# Patient Record
Sex: MALE | Race: OTHER
[De-identification: names, ages, dates, MRNs, and addresses within clinical notes are randomized per-mention and may not be internally consistent; named-entity substitution may affect disease eponyms.]

---

## 2019-04-05 ENCOUNTER — HOSPITAL ENCOUNTER (OUTPATIENT)
Dept: HOSPITAL 8 - RAD | Age: 49
Discharge: HOME | End: 2019-04-05
Attending: FAMILY MEDICINE
Payer: COMMERCIAL

## 2019-04-05 DIAGNOSIS — I70.90: Primary | ICD-10-CM

## 2019-04-05 DIAGNOSIS — K42.9: ICD-10-CM

## 2019-04-05 DIAGNOSIS — Z87.828: ICD-10-CM

## 2019-04-05 DIAGNOSIS — Z88.0: ICD-10-CM

## 2019-04-05 DIAGNOSIS — Z95.820: ICD-10-CM

## 2019-04-05 DIAGNOSIS — I77.1: ICD-10-CM

## 2019-04-05 PROCEDURE — 73706 CT ANGIO LWR EXTR W/O&W/DYE: CPT

## 2021-01-28 ENCOUNTER — HOSPITAL ENCOUNTER (EMERGENCY)
Dept: HOSPITAL 8 - ED | Age: 51
Discharge: HOME | End: 2021-01-28
Payer: COMMERCIAL

## 2021-01-28 VITALS — BODY MASS INDEX: 41.29 KG/M2 | WEIGHT: 247.8 LBS | HEIGHT: 65 IN

## 2021-01-28 VITALS — SYSTOLIC BLOOD PRESSURE: 135 MMHG | DIASTOLIC BLOOD PRESSURE: 81 MMHG

## 2021-01-28 DIAGNOSIS — I82.432: ICD-10-CM

## 2021-01-28 DIAGNOSIS — Z87.891: ICD-10-CM

## 2021-01-28 DIAGNOSIS — I82.412: Primary | ICD-10-CM

## 2021-01-28 DIAGNOSIS — R05: ICD-10-CM

## 2021-01-28 DIAGNOSIS — M79.89: ICD-10-CM

## 2021-01-28 DIAGNOSIS — I10: ICD-10-CM

## 2021-01-28 DIAGNOSIS — I26.99: ICD-10-CM

## 2021-01-28 DIAGNOSIS — R00.9: ICD-10-CM

## 2021-01-28 DIAGNOSIS — M79.662: ICD-10-CM

## 2021-01-28 DIAGNOSIS — R07.89: ICD-10-CM

## 2021-01-28 LAB
ALBUMIN SERPL-MCNC: 3.1 G/DL (ref 3.4–5)
ANION GAP SERPL CALC-SCNC: 5 MMOL/L (ref 5–15)
BASOPHILS # BLD AUTO: 0.2 X10^3/UL (ref 0–0.1)
BASOPHILS NFR BLD AUTO: 2 % (ref 0–1)
CALCIUM SERPL-MCNC: 8.5 MG/DL (ref 8.5–10.1)
CHLORIDE SERPL-SCNC: 108 MMOL/L (ref 98–107)
CREAT SERPL-MCNC: 1.22 MG/DL (ref 0.7–1.3)
EOSINOPHIL # BLD AUTO: 0.2 X10^3/UL (ref 0–0.4)
EOSINOPHIL NFR BLD AUTO: 2 % (ref 1–7)
ERYTHROCYTE [DISTWIDTH] IN BLOOD BY AUTOMATED COUNT: 14.5 % (ref 9.4–14.8)
LYMPHOCYTES # BLD AUTO: 2.7 X10^3/UL (ref 1–3.4)
LYMPHOCYTES NFR BLD AUTO: 28 % (ref 22–44)
MCH RBC QN AUTO: 27 PG (ref 27.5–34.5)
MCHC RBC AUTO-ENTMCNC: 32.8 G/DL (ref 33.2–36.2)
MD: NO
MONOCYTES # BLD AUTO: 1.2 X10^3/UL (ref 0.2–0.8)
MONOCYTES NFR BLD AUTO: 13 % (ref 2–9)
NEUTROPHILS # BLD AUTO: 5.4 X10^3/UL (ref 1.8–6.8)
NEUTROPHILS NFR BLD AUTO: 56 % (ref 42–75)
PLATELET # BLD AUTO: 310 X10^3/UL (ref 130–400)
PMV BLD AUTO: 6.8 FL (ref 7.4–10.4)
RBC # BLD AUTO: 4.11 X10^6/UL (ref 4.38–5.82)
TROPONIN I SERPL-MCNC: < 0.015 NG/ML (ref 0–0.04)

## 2021-01-28 PROCEDURE — 36415 COLL VENOUS BLD VENIPUNCTURE: CPT

## 2021-01-28 PROCEDURE — 82040 ASSAY OF SERUM ALBUMIN: CPT

## 2021-01-28 PROCEDURE — 85520 HEPARIN ASSAY: CPT

## 2021-01-28 PROCEDURE — 71045 X-RAY EXAM CHEST 1 VIEW: CPT

## 2021-01-28 PROCEDURE — 80048 BASIC METABOLIC PNL TOTAL CA: CPT

## 2021-01-28 PROCEDURE — 93971 EXTREMITY STUDY: CPT

## 2021-01-28 PROCEDURE — 85025 COMPLETE CBC W/AUTO DIFF WBC: CPT

## 2021-01-28 PROCEDURE — 84484 ASSAY OF TROPONIN QUANT: CPT

## 2021-01-28 PROCEDURE — 85379 FIBRIN DEGRADATION QUANT: CPT

## 2021-01-28 PROCEDURE — 93005 ELECTROCARDIOGRAM TRACING: CPT

## 2021-01-28 PROCEDURE — 83880 ASSAY OF NATRIURETIC PEPTIDE: CPT

## 2021-01-28 PROCEDURE — 99285 EMERGENCY DEPT VISIT HI MDM: CPT

## 2021-01-28 PROCEDURE — 71275 CT ANGIOGRAPHY CHEST: CPT

## 2021-01-28 NOTE — NUR
CC OF LEFT LEG SWELLING AND PAIN X 3 WEEKS WITH INCREASING SOB. PT STATES HE 
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 
PT IN CUSTODY, HAS TWO GUARDS AT BEDSIDE.

## 2021-01-28 NOTE — NUR
ELIGUIS GIVEN BEFORE DISCHARGE, HEPARIN DC'D. PT AND GUARDS VERBALIZE 
UNDERSTANDING OF MEDICATIONS AND POC. PT TO BE DC'D.

## 2021-01-28 NOTE — NUR
PER DR HOPPER HOLD ON STARTING HEPARIN GTT AT THIS TIME, PT MAY BE A CANIDATE 
FOR ELIQUIS AND DC BACK TO group home. HEPARIN GTT SCANNED IN WHILE MD WALKING INTO 
ROOM, HEPARIN LINE NOT CONNECTED TO PTS PIV, MED CHARTING UNDONE AND VERIFIED 
WITH CASTILOL GARCIA.

## 2021-02-25 ENCOUNTER — HOSPITAL ENCOUNTER (OUTPATIENT)
Dept: HOSPITAL 8 - CVU | Age: 51
Discharge: HOME | End: 2021-02-25
Attending: PHYSICIAN ASSISTANT
Payer: COMMERCIAL

## 2021-02-25 DIAGNOSIS — I77.1: Primary | ICD-10-CM

## 2021-02-25 DIAGNOSIS — R10.31: ICD-10-CM

## 2021-02-25 DIAGNOSIS — I10: ICD-10-CM

## 2021-02-25 PROCEDURE — 93922 UPR/L XTREMITY ART 2 LEVELS: CPT

## 2021-02-25 PROCEDURE — 93926 LOWER EXTREMITY STUDY: CPT
